# Patient Record
Sex: MALE | Race: OTHER | HISPANIC OR LATINO
[De-identification: names, ages, dates, MRNs, and addresses within clinical notes are randomized per-mention and may not be internally consistent; named-entity substitution may affect disease eponyms.]

---

## 2018-05-02 PROBLEM — Z00.00 ENCOUNTER FOR PREVENTIVE HEALTH EXAMINATION: Status: ACTIVE | Noted: 2018-05-02

## 2018-05-10 ENCOUNTER — APPOINTMENT (OUTPATIENT)
Dept: VASCULAR SURGERY | Facility: CLINIC | Age: 73
End: 2018-05-10

## 2018-05-31 ENCOUNTER — APPOINTMENT (OUTPATIENT)
Dept: VASCULAR SURGERY | Facility: CLINIC | Age: 73
End: 2018-05-31
Payer: MEDICARE

## 2018-05-31 VITALS — HEIGHT: 67 IN | WEIGHT: 130 LBS | BODY MASS INDEX: 20.4 KG/M2

## 2018-05-31 PROCEDURE — 99024 POSTOP FOLLOW-UP VISIT: CPT

## 2018-05-31 RX ORDER — INSULIN LISPRO 100 [IU]/ML
100 INJECTION, SOLUTION INTRAVENOUS; SUBCUTANEOUS
Qty: 15 | Refills: 0 | Status: ACTIVE | COMMUNITY
Start: 2017-12-21

## 2018-05-31 RX ORDER — PEN NEEDLE, DIABETIC 31 GX3/16"
31G X 8 MM NEEDLE, DISPOSABLE MISCELLANEOUS
Qty: 100 | Refills: 0 | Status: ACTIVE | COMMUNITY
Start: 2018-03-01

## 2018-05-31 RX ORDER — HYDRALAZINE HYDROCHLORIDE 50 MG/1
50 TABLET ORAL
Qty: 90 | Refills: 0 | Status: ACTIVE | COMMUNITY
Start: 2018-02-15

## 2018-05-31 RX ORDER — HYDRALAZINE HYDROCHLORIDE 100 MG/1
100 TABLET ORAL
Qty: 90 | Refills: 0 | Status: ACTIVE | COMMUNITY
Start: 2018-02-15

## 2018-05-31 RX ORDER — INSULIN DETEMIR 100 [IU]/ML
100 INJECTION, SOLUTION SUBCUTANEOUS
Qty: 15 | Refills: 0 | Status: ACTIVE | COMMUNITY
Start: 2018-01-11

## 2018-05-31 RX ORDER — MUPIROCIN 20 MG/G
2 OINTMENT TOPICAL
Qty: 22 | Refills: 0 | Status: ACTIVE | COMMUNITY
Start: 2018-03-27

## 2018-05-31 RX ORDER — MIRTAZAPINE 7.5 MG/1
7.5 TABLET, FILM COATED ORAL
Qty: 30 | Refills: 0 | Status: ACTIVE | COMMUNITY
Start: 2018-01-25

## 2018-05-31 RX ORDER — FUROSEMIDE 40 MG/1
40 TABLET ORAL
Qty: 12 | Refills: 0 | Status: ACTIVE | COMMUNITY
Start: 2018-01-25

## 2018-05-31 RX ORDER — LACTULOSE SOLUTION USP, 10 G/15 ML 10 G/15ML
10 SOLUTION ORAL; RECTAL
Qty: 473 | Refills: 0 | Status: ACTIVE | COMMUNITY
Start: 2018-01-11

## 2018-05-31 RX ORDER — CALCITRIOL 0.25 UG/1
0.25 CAPSULE, LIQUID FILLED ORAL
Qty: 30 | Refills: 0 | Status: ACTIVE | COMMUNITY
Start: 2018-02-22

## 2018-05-31 RX ORDER — FUROSEMIDE 80 MG/1
80 TABLET ORAL
Qty: 30 | Refills: 0 | Status: ACTIVE | COMMUNITY
Start: 2018-02-15

## 2018-05-31 RX ORDER — CLONIDINE HYDROCHLORIDE 0.1 MG/1
0.1 TABLET ORAL
Qty: 60 | Refills: 0 | Status: ACTIVE | COMMUNITY
Start: 2018-02-15

## 2018-05-31 RX ORDER — NIFEDIPINE 60 MG/1
60 TABLET, FILM COATED, EXTENDED RELEASE ORAL
Qty: 60 | Refills: 0 | Status: ACTIVE | COMMUNITY
Start: 2018-01-25

## 2018-05-31 RX ORDER — CYPROHEPTADINE HYDROCHLORIDE 4 MG/1
4 TABLET ORAL
Qty: 60 | Refills: 0 | Status: ACTIVE | COMMUNITY
Start: 2018-01-04

## 2018-05-31 RX ORDER — LEVETIRACETAM 250 MG/1
250 TABLET, FILM COATED ORAL
Qty: 60 | Refills: 0 | Status: ACTIVE | COMMUNITY
Start: 2018-02-22

## 2018-05-31 RX ORDER — LEVOFLOXACIN 250 MG/1
250 TABLET, FILM COATED ORAL
Qty: 4 | Refills: 0 | Status: ACTIVE | COMMUNITY
Start: 2018-03-27

## 2018-06-14 ENCOUNTER — APPOINTMENT (OUTPATIENT)
Dept: VASCULAR SURGERY | Facility: CLINIC | Age: 73
End: 2018-06-14
Payer: MEDICARE

## 2018-06-14 PROCEDURE — 99024 POSTOP FOLLOW-UP VISIT: CPT

## 2018-06-28 ENCOUNTER — APPOINTMENT (OUTPATIENT)
Dept: VASCULAR SURGERY | Facility: CLINIC | Age: 73
End: 2018-06-28

## 2018-07-12 ENCOUNTER — APPOINTMENT (OUTPATIENT)
Dept: VASCULAR SURGERY | Facility: CLINIC | Age: 73
End: 2018-07-12
Payer: MEDICARE

## 2018-07-12 DIAGNOSIS — Z89.431 ACQUIRED ABSENCE OF RIGHT FOOT: ICD-10-CM

## 2018-07-12 PROCEDURE — 99024 POSTOP FOLLOW-UP VISIT: CPT

## 2018-07-17 DIAGNOSIS — Z86.69 PERSONAL HISTORY OF OTHER DISEASES OF THE NERVOUS SYSTEM AND SENSE ORGANS: ICD-10-CM

## 2018-07-17 DIAGNOSIS — Z86.19 PERSONAL HISTORY OF OTHER INFECTIOUS AND PARASITIC DISEASES: ICD-10-CM

## 2018-07-17 DIAGNOSIS — T86.19 OTHER COMPLICATION OF KIDNEY TRANSPLANT: ICD-10-CM

## 2018-07-17 DIAGNOSIS — N18.4 CHRONIC KIDNEY DISEASE, STAGE 4 (SEVERE): ICD-10-CM

## 2018-07-17 DIAGNOSIS — I70.1 OTHER COMPLICATION OF KIDNEY TRANSPLANT: ICD-10-CM

## 2018-07-17 DIAGNOSIS — E78.5 HYPERLIPIDEMIA, UNSPECIFIED: ICD-10-CM

## 2018-07-17 DIAGNOSIS — Z01.810 ENCOUNTER FOR PREPROCEDURAL CARDIOVASCULAR EXAMINATION: ICD-10-CM

## 2018-07-17 DIAGNOSIS — R91.1 SOLITARY PULMONARY NODULE: ICD-10-CM

## 2018-07-17 DIAGNOSIS — Z83.3 FAMILY HISTORY OF DIABETES MELLITUS: ICD-10-CM

## 2018-07-17 DIAGNOSIS — Z87.19 PERSONAL HISTORY OF OTHER DISEASES OF THE DIGESTIVE SYSTEM: ICD-10-CM

## 2018-07-17 DIAGNOSIS — I10 ESSENTIAL (PRIMARY) HYPERTENSION: ICD-10-CM

## 2018-07-17 DIAGNOSIS — Z86.39 PERSONAL HISTORY OF OTHER ENDOCRINE, NUTRITIONAL AND METABOLIC DISEASE: ICD-10-CM

## 2018-07-17 RX ORDER — METOPROLOL TARTRATE 50 MG/1
50 TABLET, FILM COATED ORAL TWICE DAILY
Refills: 0 | Status: ACTIVE | COMMUNITY
Start: 2018-03-15

## 2018-07-17 RX ORDER — ASPIRIN 81 MG/1
81 TABLET ORAL DAILY
Refills: 0 | Status: ACTIVE | COMMUNITY
Start: 2018-02-22

## 2018-07-17 RX ORDER — CLOPIDOGREL BISULFATE 75 MG/1
75 TABLET, FILM COATED ORAL DAILY
Refills: 0 | Status: ACTIVE | COMMUNITY
Start: 2018-02-22

## 2018-07-17 RX ORDER — TAMSULOSIN HYDROCHLORIDE 0.4 MG/1
0.4 CAPSULE ORAL AT BEDTIME
Refills: 0 | Status: ACTIVE | COMMUNITY
Start: 2018-02-22

## 2018-07-17 RX ORDER — FAMOTIDINE 20 MG/1
20 TABLET, FILM COATED ORAL DAILY
Refills: 0 | Status: ACTIVE | COMMUNITY
Start: 2018-02-22

## 2018-07-17 RX ORDER — FINASTERIDE 5 MG/1
5 TABLET, FILM COATED ORAL DAILY
Refills: 0 | Status: ACTIVE | COMMUNITY
Start: 2018-02-22

## 2018-07-17 RX ORDER — ATORVASTATIN CALCIUM 10 MG/1
10 TABLET, FILM COATED ORAL AT BEDTIME
Refills: 0 | Status: ACTIVE | COMMUNITY
Start: 2018-02-22

## 2018-07-17 RX ORDER — PREDNISONE 5 MG/1
5 TABLET ORAL DAILY
Refills: 0 | Status: ACTIVE | COMMUNITY
Start: 2018-02-08

## 2018-07-20 ENCOUNTER — APPOINTMENT (OUTPATIENT)
Dept: CARDIOLOGY | Facility: CLINIC | Age: 73
End: 2018-07-20

## 2018-08-16 ENCOUNTER — APPOINTMENT (OUTPATIENT)
Dept: VASCULAR SURGERY | Facility: CLINIC | Age: 73
End: 2018-08-16
Payer: MEDICARE

## 2018-08-16 PROCEDURE — 99024 POSTOP FOLLOW-UP VISIT: CPT

## 2018-08-30 ENCOUNTER — APPOINTMENT (OUTPATIENT)
Dept: VASCULAR SURGERY | Facility: CLINIC | Age: 73
End: 2018-08-30
Payer: MEDICARE

## 2018-08-30 PROCEDURE — 99024 POSTOP FOLLOW-UP VISIT: CPT

## 2018-08-30 RX ORDER — SILVER SULFADIAZINE 10 MG/G
1 CREAM TOPICAL DAILY
Qty: 1 | Refills: 4 | Status: ACTIVE | COMMUNITY
Start: 2018-08-30 | End: 1900-01-01

## 2018-09-04 ENCOUNTER — APPOINTMENT (OUTPATIENT)
Dept: VASCULAR SURGERY | Facility: CLINIC | Age: 73
End: 2018-09-04
Payer: MEDICARE

## 2018-09-04 PROCEDURE — 99024 POSTOP FOLLOW-UP VISIT: CPT

## 2018-09-20 ENCOUNTER — APPOINTMENT (OUTPATIENT)
Dept: VASCULAR SURGERY | Facility: CLINIC | Age: 73
End: 2018-09-20

## 2018-11-15 ENCOUNTER — APPOINTMENT (OUTPATIENT)
Dept: VASCULAR SURGERY | Facility: CLINIC | Age: 73
End: 2018-11-15
Payer: MEDICARE

## 2018-11-15 PROCEDURE — 99024 POSTOP FOLLOW-UP VISIT: CPT

## 2018-12-18 ENCOUNTER — APPOINTMENT (OUTPATIENT)
Dept: VASCULAR SURGERY | Facility: HOSPITAL | Age: 73
End: 2018-12-18
Payer: MEDICARE

## 2018-12-18 PROCEDURE — 15100 SPLT AGRFT T/A/L 1ST 100SQCM: CPT | Mod: RT

## 2018-12-18 PROCEDURE — 15101 SPLT AGRFT T/A/L EA ADDL 100: CPT | Mod: RT

## 2018-12-19 PROCEDURE — 36902 INTRO CATH DIALYSIS CIRCUIT: CPT | Mod: 79

## 2018-12-19 PROCEDURE — 36215 PLACE CATHETER IN ARTERY: CPT | Mod: 79

## 2018-12-19 PROCEDURE — 76937 US GUIDE VASCULAR ACCESS: CPT | Mod: 26

## 2018-12-27 ENCOUNTER — APPOINTMENT (OUTPATIENT)
Dept: VASCULAR SURGERY | Facility: CLINIC | Age: 73
End: 2018-12-27
Payer: MEDICARE

## 2018-12-27 VITALS — SYSTOLIC BLOOD PRESSURE: 101 MMHG | HEART RATE: 76 BPM | DIASTOLIC BLOOD PRESSURE: 66 MMHG

## 2018-12-27 PROCEDURE — 99024 POSTOP FOLLOW-UP VISIT: CPT

## 2019-03-19 ENCOUNTER — RESULT REVIEW (OUTPATIENT)
Age: 74
End: 2019-03-19

## 2019-05-09 ENCOUNTER — APPOINTMENT (OUTPATIENT)
Dept: VASCULAR SURGERY | Facility: CLINIC | Age: 74
End: 2019-05-09
Payer: MEDICARE

## 2019-05-09 PROCEDURE — 99213 OFFICE O/P EST LOW 20 MIN: CPT

## 2019-05-09 RX ORDER — SILVER SULFADIAZINE 10 MG/G
1 CREAM TOPICAL DAILY
Qty: 1 | Refills: 4 | Status: ACTIVE | COMMUNITY
Start: 2019-05-09 | End: 1900-01-01

## 2019-06-03 NOTE — REASON FOR VISIT
[de-identified] : right leg STSG of open BKA stump [de-identified] : s/p right leg STSG of open BKA stump. Doing well. No pain. No fevers or chills. [Spouse] : spouse

## 2019-06-03 NOTE — DISCUSSION/SUMMARY
[FreeTextEntry1] : initially well healed right thigh donor site, now with some superficial breakdown\par clean, no infetion\par They will continue f/u with me at wound care center on Monday.

## 2019-06-03 NOTE — PHYSICAL EXAM
[Alert] : alert [Oriented to Person] : oriented to person [Oriented to Place] : oriented to place [Calm] : calm [de-identified] : NCAT [de-identified] : NAD [de-identified] : supple [FreeTextEntry1] : left arm radiocephalic avf with palpable thrill [de-identified] : both bka stumps well healed

## 2019-06-20 ENCOUNTER — APPOINTMENT (OUTPATIENT)
Dept: VASCULAR SURGERY | Facility: CLINIC | Age: 74
End: 2019-06-20
Payer: MEDICARE

## 2019-06-20 VITALS — DIASTOLIC BLOOD PRESSURE: 93 MMHG | SYSTOLIC BLOOD PRESSURE: 163 MMHG | HEART RATE: 73 BPM

## 2019-06-20 PROCEDURE — 99213 OFFICE O/P EST LOW 20 MIN: CPT

## 2019-06-20 PROCEDURE — 93990 DOPPLER FLOW TESTING: CPT

## 2019-06-26 ENCOUNTER — RESULT REVIEW (OUTPATIENT)
Age: 74
End: 2019-06-26

## 2019-06-26 ENCOUNTER — APPOINTMENT (OUTPATIENT)
Dept: VASCULAR SURGERY | Facility: HOSPITAL | Age: 74
End: 2019-06-26
Payer: MEDICARE

## 2019-06-26 PROCEDURE — 36902 INTRO CATH DIALYSIS CIRCUIT: CPT | Mod: 59

## 2019-06-26 PROCEDURE — 36215 PLACE CATHETER IN ARTERY: CPT

## 2019-06-26 PROCEDURE — 76937 US GUIDE VASCULAR ACCESS: CPT | Mod: 26

## 2019-07-24 NOTE — ASSESSMENT
[FreeTextEntry1] : Pt with inadequate HD due to low flows\par HD access DUS performed, reveals inflow radial artery, anastamsosis and juxta-anastamotic stenosis\par Will schedule fistulogram and intervention

## 2019-07-24 NOTE — PHYSICAL EXAM
[Normal Breath Sounds] : Normal breath sounds [2+] : left 2+ [Alert] : alert [Oriented to Person] : oriented to person [Oriented to Place] : oriented to place [de-identified] : NCAT [de-identified] : NAD [Calm] : calm [FreeTextEntry1] : left arm radiocephalic avf with very weak thrill [de-identified] : supple [de-identified] : both bka stumps well healed

## 2019-07-24 NOTE — HISTORY OF PRESENT ILLNESS
[de-identified] : BKA stumps remain fully healed. He is using prosthesis. Ambulating for some parts of the day.\par Reports that HD is not going well, low flows from AVF.

## 2019-07-24 NOTE — REASON FOR VISIT
[Follow-Up: _____] : a [unfilled] follow-up visit [de-identified] : he is s/p b/l BKA [de-identified] : BKA stumps remain fully healed. He is using prosthesis. Ambulating for some parts of the day.\par Reports that HD is not going well, low flows from AVF. [Spouse] : spouse

## 2019-07-24 NOTE — PROCEDURE
[FreeTextEntry1] : HD access DUS performed - inflow radial artery, anastamsosis and juxta-anastamotic stenosis

## 2019-08-01 ENCOUNTER — APPOINTMENT (OUTPATIENT)
Dept: VASCULAR SURGERY | Facility: CLINIC | Age: 74
End: 2019-08-01
Payer: MEDICARE

## 2019-08-01 PROCEDURE — 99214 OFFICE O/P EST MOD 30 MIN: CPT

## 2019-08-01 NOTE — ASSESSMENT
[FreeTextEntry1] : Underwent left arm fistulogram, fistuloplasty and angioplasty of inflow radial artery with good results. Now with palpable thrill, undergoing HD without problems. He will continue use of AVF.\par He will f/u with me in 2 months.

## 2019-08-01 NOTE — PHYSICAL EXAM
[Normal Breath Sounds] : Normal breath sounds [2+] : left 2+ [Alert] : alert [Oriented to Person] : oriented to person [Oriented to Place] : oriented to place [Calm] : calm [de-identified] : NAD [de-identified] : NCAT [de-identified] : supple [FreeTextEntry1] : left arm radiocephalic avf with palpable thrill [de-identified] : both bka stumps well healed

## 2019-08-01 NOTE — HISTORY OF PRESENT ILLNESS
[de-identified] : BKA stumps remain fully healed. He is using prosthesis. Ambulating for some parts of the day.\par Reports that HD is not going well, low flows from AVF.\par \par 8/1/19 - Underwent fistulogram, plasty of his AVF. Now undergoing HD without problems.

## 2019-09-12 ENCOUNTER — APPOINTMENT (OUTPATIENT)
Dept: VASCULAR SURGERY | Facility: CLINIC | Age: 74
End: 2019-09-12
Payer: MEDICARE

## 2019-09-12 PROCEDURE — 99214 OFFICE O/P EST MOD 30 MIN: CPT

## 2019-09-12 NOTE — HISTORY OF PRESENT ILLNESS
[FreeTextEntry1] : 74 y/o M s/p b/l BKA.\par ESRD on HD, undergoing HD via L radiocephalic AVF [de-identified] : BKA stumps remain fully healed. He is using prosthesis. Ambulating for some parts of the day.\par Reports that HD is not going well, low flows from AVF.\par \par 8/1/19 - Underwent fistulogram, plasty of his AVF. Now undergoing HD without problems.\par \par 9/12/19 - Doing well. Wife is concerned regarding "pressure point" over his right BKA stump. Continuing HD without any problems.

## 2019-09-12 NOTE — ASSESSMENT
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 His fistula is currently functioning well without any problems with HD.\par In addition, he is s/p b/l BKA. Right BKA with wound healing problems post op, ultimately required STSG. It is now well healed, and he has a functioning prostheis on both legs.\par \par Continue HD via AVF\par F/U in 3 months, sooner if any problems arise

## 2019-09-12 NOTE — PHYSICAL EXAM
[Normal Breath Sounds] : Normal breath sounds [2+] : right 2+ [Oriented to Person] : oriented to person [Alert] : alert [Calm] : calm [Oriented to Place] : oriented to place [de-identified] : NCAT [de-identified] : NAD [de-identified] : supple [FreeTextEntry1] : left arm radiocephalic avf with palpable thrill [de-identified] : both bka stumps well healed

## 2019-10-03 ENCOUNTER — APPOINTMENT (OUTPATIENT)
Dept: VASCULAR SURGERY | Facility: CLINIC | Age: 74
End: 2019-10-03
Payer: MEDICARE

## 2019-10-03 VITALS
BODY MASS INDEX: 20.36 KG/M2 | SYSTOLIC BLOOD PRESSURE: 156 MMHG | HEART RATE: 76 BPM | WEIGHT: 130 LBS | DIASTOLIC BLOOD PRESSURE: 80 MMHG | TEMPERATURE: 98 F

## 2019-10-03 DIAGNOSIS — Z89.511 ACQUIRED ABSENCE OF RIGHT LEG BELOW KNEE: ICD-10-CM

## 2019-10-03 PROCEDURE — 93990 DOPPLER FLOW TESTING: CPT

## 2019-10-03 PROCEDURE — 99214 OFFICE O/P EST MOD 30 MIN: CPT

## 2019-10-03 RX ORDER — SILVER SULFADIAZINE 10 MG/G
1 CREAM TOPICAL DAILY
Qty: 1 | Refills: 4 | Status: ACTIVE | COMMUNITY
Start: 2019-10-03 | End: 1900-01-01

## 2019-10-03 NOTE — PHYSICAL EXAM
[Normal Breath Sounds] : Normal breath sounds [2+] : left 2+ [Alert] : alert [Oriented to Person] : oriented to person [Oriented to Place] : oriented to place [Calm] : calm [de-identified] : NAD [de-identified] : NCAT [de-identified] : supple [FreeTextEntry1] : left arm radiocephalic AVF with very soft thrill, soft pulse on outflow compression of avf [de-identified] : right BKA stump with focal superficial ulceration, probes about 3mm, no purulence expressed, no erythema / warmth

## 2019-10-03 NOTE — HISTORY OF PRESENT ILLNESS
[FreeTextEntry1] : 72 y/o M s/p b/l BKA.\par ESRD on HD, undergoing HD via L radiocephalic AVF [de-identified] : BKA stumps remain fully healed. He is using prosthesis. Ambulating for some parts of the day.\par Reports that HD is not going well, low flows from AVF.\par \par 8/1/19 - Underwent fistulogram, plasty of his AVF. Now undergoing HD without problems.\par \par 9/12/19 - Doing well. Wife is concerned regarding "pressure point" over his right BKA stump. Continuing HD without any problems.\par \par 10/3/19 - Developed focal skin breakdown and bleeding from base of right BKA stump. Denies fevers or chills.

## 2019-10-03 NOTE — ASSESSMENT
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 On exam, he has a very weak thrill, and HD access DUS reveals inflow radial artery and juxtaanastomotic AVF with high grade stenosis. We discussed the risks and benefits of proceeding with fistulogram and intervention, he wishes to proceed. Will schedule fistulogram for next week.\par \par In addition, he has a focal ulceration at the base of his right BKA stump. There is no current evidence of infection. He will apply silvadene and a gauze dressing to the site daily, and not use his prosthesis for the time being. He will see me next week for f/u of this wound.is s/p b/l BKA.

## 2019-10-03 NOTE — PROCEDURE
[FreeTextEntry1] : left arm HD access DUS - inflow radial artery and juxtaanastomotic AVF with high grade stenosis

## 2019-10-08 ENCOUNTER — RESULT REVIEW (OUTPATIENT)
Age: 74
End: 2019-10-08

## 2019-10-08 ENCOUNTER — APPOINTMENT (OUTPATIENT)
Dept: VASCULAR SURGERY | Facility: HOSPITAL | Age: 74
End: 2019-10-08
Payer: MEDICARE

## 2019-10-08 PROCEDURE — 37246 TRLUML BALO ANGIOP 1ST ART: CPT | Mod: LT

## 2019-10-08 PROCEDURE — 36902 INTRO CATH DIALYSIS CIRCUIT: CPT | Mod: 59

## 2019-10-08 PROCEDURE — 36215 PLACE CATHETER IN ARTERY: CPT

## 2019-10-08 PROCEDURE — 76937 US GUIDE VASCULAR ACCESS: CPT | Mod: 26

## 2019-10-21 PROBLEM — T87.43 RIGHT BKA INFECTION: Status: ACTIVE | Noted: 2019-10-21

## 2019-10-24 ENCOUNTER — APPOINTMENT (OUTPATIENT)
Dept: VASCULAR SURGERY | Facility: CLINIC | Age: 74
End: 2019-10-24

## 2019-10-24 DIAGNOSIS — T87.43: ICD-10-CM

## 2019-11-05 PROBLEM — Z89.431 STATUS POST TRANSMETATARSAL AMPUTATION OF RIGHT FOOT: Status: ACTIVE | Noted: 2018-05-31

## 2019-12-05 ENCOUNTER — APPOINTMENT (OUTPATIENT)
Dept: VASCULAR SURGERY | Facility: CLINIC | Age: 74
End: 2019-12-05
Payer: MEDICARE

## 2019-12-05 PROCEDURE — 99213 OFFICE O/P EST LOW 20 MIN: CPT

## 2020-01-02 ENCOUNTER — APPOINTMENT (OUTPATIENT)
Dept: VASCULAR SURGERY | Facility: CLINIC | Age: 75
End: 2020-01-02
Payer: MEDICARE

## 2020-01-02 PROCEDURE — 99213 OFFICE O/P EST LOW 20 MIN: CPT

## 2020-01-02 PROCEDURE — 93990 DOPPLER FLOW TESTING: CPT

## 2020-01-02 NOTE — HISTORY OF PRESENT ILLNESS
[FreeTextEntry1] : 74 y/o M s/p b/l BKA.\par ESRD on HD, undergoing HD via L radiocephalic AVF [de-identified] : BKA stumps remain fully healed. He is using prosthesis. Ambulating for some parts of the day.\par Reports that HD is not going well, low flows from AVF.\par \par 8/1/19 - Underwent fistulogram, plasty of his AVF. Now undergoing HD without problems.\par \par 9/12/19 - Doing well. Wife is concerned regarding "pressure point" over his right BKA stump. Continuing HD without any problems.\par \par 10/3/19 - Developed focal skin breakdown and bleeding from base of right BKA stump. Denies fevers or chills.\par \par 1/2/19  - Doing well. Right BKA focal ulcers fully healed. Undergoing HD via AVF.

## 2020-01-02 NOTE — PHYSICAL EXAM
[Normal Breath Sounds] : Normal breath sounds [Oriented to Person] : oriented to person [2+] : left 2+ [Alert] : alert [Calm] : calm [de-identified] : NAD [Oriented to Place] : oriented to place [de-identified] : NCAT [de-identified] : supple [FreeTextEntry1] : left arm radiocephalic AVF with very soft thrill, soft pulse on outflow compression of af [de-identified] : right BKA stump fully healed, no ulcerations

## 2020-01-02 NOTE — PROCEDURE
[FreeTextEntry1] : left arm HD access DUS - juxtaanastomotic AVF with high grade stenosis, low volumes

## 2020-01-02 NOTE — ASSESSMENT
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 On exam, he has a very weak thrill, and HD access DUS reveals inflow radial artery and juxtaanastomotic AVF with high grade stenosis. We discussed the risks and benefits of proceeding with fistulogram and intervention, he wishes to proceed. Will schedule fistulogram for next week.

## 2020-01-16 ENCOUNTER — RESULT REVIEW (OUTPATIENT)
Age: 75
End: 2020-01-16

## 2020-01-16 ENCOUNTER — APPOINTMENT (OUTPATIENT)
Dept: VASCULAR SURGERY | Facility: HOSPITAL | Age: 75
End: 2020-01-16

## 2020-01-16 NOTE — PHYSICAL EXAM
[Normal Breath Sounds] : Normal breath sounds [2+] : left 2+ [Alert] : alert [Oriented to Person] : oriented to person [Calm] : calm [Oriented to Place] : oriented to place [de-identified] : NAD [de-identified] : NCAT [de-identified] : supple [de-identified] : right BKA stump with focal superficial ulceration, probes about 3mm, no purulence expressed, no erythema / warmth [FreeTextEntry1] : left arm radiocephalic AVF with very soft thrill, soft pulse on outflow compression of avf

## 2020-01-16 NOTE — ASSESSMENT
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 On exam, he has a very weak thrill, and HD access DUS reveals inflow radial artery and juxtaanastomotic AVF with high grade stenosis. We discussed the risks and benefits of proceeding with fistulogram and intervention, he wishes to proceed. Will schedule fistulogram for next week.

## 2020-01-16 NOTE — HISTORY OF PRESENT ILLNESS
[FreeTextEntry1] : 72 y/o M s/p b/l BKA.\par ESRD on HD, undergoing HD via L radiocephalic AVF [de-identified] : BKA stumps remain fully healed. He is using prosthesis. Ambulating for some parts of the day.\par Reports that HD is not going well, low flows from AVF.\par \par 8/1/19 - Underwent fistulogram, plasty of his AVF. Now undergoing HD without problems.\par \par 9/12/19 - Doing well. Wife is concerned regarding "pressure point" over his right BKA stump. Continuing HD without any problems.\par \par 10/3/19 - Developed focal skin breakdown and bleeding from base of right BKA stump. Denies fevers or chills.\par \par 12/5/19 - Undergoing HD via left wrist AVF, episodes of low flow.

## 2020-02-13 ENCOUNTER — APPOINTMENT (OUTPATIENT)
Dept: VASCULAR SURGERY | Facility: CLINIC | Age: 75
End: 2020-02-13
Payer: MEDICARE

## 2020-02-13 PROCEDURE — 99213 OFFICE O/P EST LOW 20 MIN: CPT

## 2020-02-13 NOTE — ASSESSMENT
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 He underwent a fistulogram, angioplasty of inflow radial artery and fistuloplasty. He has an excellent thrill in AVF, and is undergoing HD without issues.\par He will f/u with me in 2-3 months, sooner if any issues with HD.

## 2020-02-13 NOTE — PHYSICAL EXAM
[Normal Breath Sounds] : Normal breath sounds [2+] : right 2+ [Oriented to Person] : oriented to person [Alert] : alert [Oriented to Place] : oriented to place [Calm] : calm [de-identified] : NAD [de-identified] : NCAT [de-identified] : supple [FreeTextEntry1] : left arm radiocephalic AVF with excellent palpable thrill [de-identified] : right BKA stump fully healed, no ulcerations

## 2020-04-16 ENCOUNTER — APPOINTMENT (OUTPATIENT)
Dept: VASCULAR SURGERY | Facility: CLINIC | Age: 75
End: 2020-04-16
Payer: MEDICARE

## 2020-04-16 PROCEDURE — 99213 OFFICE O/P EST LOW 20 MIN: CPT

## 2020-04-16 NOTE — HISTORY OF PRESENT ILLNESS
[FreeTextEntry1] : 74 y/o M s/p b/l BKA.\par ESRD on HD, undergoing HD via L radiocephalic AVF [de-identified] : BKA stumps remain fully healed. He is using prosthesis. Ambulating for some parts of the day.\par Reports that HD is not going well, low flows from AVF.\par \par 8/1/19 - Underwent fistulogram, plasty of his AVF. Now undergoing HD without problems.\par \par 9/12/19 - Doing well. Wife is concerned regarding "pressure point" over his right BKA stump. Continuing HD without any problems.\par \par 10/3/19 - Developed focal skin breakdown and bleeding from base of right BKA stump. Denies fevers or chills.\par \par 1/2/19  - Doing well. Right BKA focal ulcers fully healed. Undergoing HD via AVF.\par \par 4/16/20 - s/p fistulogram, angioplasty of inflow radial artery and fistuloplasty in January; doing well, undergoing HD without problems.

## 2020-04-16 NOTE — ASSESSMENT
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 He underwent a fistulogram, angioplasty of inflow radial artery and fistuloplasty 3 months ago. He has an excellent thrill in AVF, and is undergoing HD without issues.\par He will f/u with me in 2-3 months, sooner if any issues with HD.

## 2020-04-16 NOTE — PHYSICAL EXAM
[Normal Breath Sounds] : Normal breath sounds [2+] : left 2+ [Alert] : alert [Oriented to Person] : oriented to person [Oriented to Place] : oriented to place [Calm] : calm [de-identified] : NAD [de-identified] : NCAT [de-identified] : supple [FreeTextEntry1] : left arm radiocephalic AVF with excellent palpable thrill [de-identified] : right BKA stump fully healed, no ulcerations

## 2020-05-14 ENCOUNTER — APPOINTMENT (OUTPATIENT)
Dept: VASCULAR SURGERY | Facility: CLINIC | Age: 75
End: 2020-05-14
Payer: MEDICARE

## 2020-05-14 PROCEDURE — 93990 DOPPLER FLOW TESTING: CPT

## 2020-05-14 PROCEDURE — 99214 OFFICE O/P EST MOD 30 MIN: CPT

## 2020-05-14 NOTE — HISTORY OF PRESENT ILLNESS
[de-identified] : BKA stumps remain fully healed. He is using prosthesis. Ambulating for some parts of the day.\par Reports that HD is not going well, low flows from AVF.\par \par 8/1/19 - Underwent fistulogram, plasty of his AVF. Now undergoing HD without problems.\par \par 9/12/19 - Doing well. Wife is concerned regarding "pressure point" over his right BKA stump. Continuing HD without any problems.\par \par 10/3/19 - Developed focal skin breakdown and bleeding from base of right BKA stump. Denies fevers or chills.\par \par 1/2/19  - Doing well. Right BKA focal ulcers fully healed. Undergoing HD via AVF.\par \par 4/16/20 - s/p fistulogram, angioplasty of inflow radial artery and fistuloplasty in January; doing well, undergoing HD without problems.\par \par 5/14/20 - Received call from Dr. Rosales nephrologist, Mr. Armstrong is confused, loss of appetite, not receiving adequate HD with poor flows. [FreeTextEntry1] : 74 y/o M s/p b/l BKA.\par ESRD on HD, undergoing HD via L radiocephalic AVF

## 2020-05-14 NOTE — ASSESSMENT
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 Received call from Dr. Rosales, inadequate HD, poor flows. Pt is developing confusion and loss of appetite. DUS reveals high grade stenosis of radial artery proximal to AVF, anastomotic and juxta-anastomotic stenosis, poor flows distally. Dr. Rosales and patient / wife strongly prefer outpatient procedure to salvage AVF and then dialyze, they want to avoid hospitalization during this time.\par On schedule for fistulogram / intervention tomorrow.

## 2020-05-14 NOTE — PHYSICAL EXAM
[Normal Breath Sounds] : Normal breath sounds [2+] : left 2+ [Alert] : alert [Oriented to Person] : oriented to person [Oriented to Place] : oriented to place [de-identified] : NAD [Calm] : calm [de-identified] : supple [de-identified] : NCAT [FreeTextEntry1] : left arm radiocephalic AVF with very soft thrill [de-identified] : right BKA stump fully healed, no ulcerations

## 2020-05-14 NOTE — PROCEDURE
[FreeTextEntry1] : left arm HD access DUS performed - high grade stenosis of radial artery proximal to AVF, anastomotic and juxta-anastomotic stenosis, poor flows distally

## 2020-05-15 ENCOUNTER — RESULT REVIEW (OUTPATIENT)
Age: 75
End: 2020-05-15

## 2020-05-28 ENCOUNTER — APPOINTMENT (OUTPATIENT)
Dept: VASCULAR SURGERY | Facility: CLINIC | Age: 75
End: 2020-05-28
Payer: MEDICARE

## 2020-05-28 PROCEDURE — 99214 OFFICE O/P EST MOD 30 MIN: CPT

## 2020-05-28 NOTE — HISTORY OF PRESENT ILLNESS
[FreeTextEntry1] : 72 y/o M s/p b/l BKA.\par ESRD on HD, undergoing HD via L radiocephalic AVF [de-identified] : BKA stumps remain fully healed. He is using prosthesis. Ambulating for some parts of the day.\par Reports that HD is not going well, low flows from AVF.\par \par 8/1/19 - Underwent fistulogram, plasty of his AVF. Now undergoing HD without problems.\par \par 9/12/19 - Doing well. Wife is concerned regarding "pressure point" over his right BKA stump. Continuing HD without any problems.\par \par 10/3/19 - Developed focal skin breakdown and bleeding from base of right BKA stump. Denies fevers or chills.\par \par 1/2/19  - Doing well. Right BKA focal ulcers fully healed. Undergoing HD via AVF.\par \par 4/16/20 - s/p fistulogram, angioplasty of inflow radial artery and fistuloplasty in January; doing well, undergoing HD without problems.\par \par 5/14/20 - Received call from Dr. Rosales nephrologist, Mr. Armstrong is confused, loss of appetite, not receiving adequate HD with poor flows.\par \par 5/28/20 - 2 weeks s/p left arm fistulogram, underwent angioplasty of inflow radial artery and fistuloplasty. Now receiving HD without issues, feels well, mental status and appetite back to normal.

## 2020-05-28 NOTE — ASSESSMENT
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 Received call from Dr. Rosales, inadequate HD, poor flows. Pt is developing confusion and loss of appetite. He underwent a fistulogram, angioplasty of inflow radial artery and fistuloplasty. He has an excellent thrill in AVF, and is undergoing HD without issues. His mental status and appetite have returned to normal, he feels well.\par \par He will f/u with me in 2 months, sooner if any issues with HD.

## 2020-07-09 ENCOUNTER — APPOINTMENT (OUTPATIENT)
Dept: VASCULAR SURGERY | Facility: CLINIC | Age: 75
End: 2020-07-09

## 2020-07-16 ENCOUNTER — APPOINTMENT (OUTPATIENT)
Dept: VASCULAR SURGERY | Facility: CLINIC | Age: 75
End: 2020-07-16
Payer: MEDICARE

## 2020-07-16 PROCEDURE — 99213 OFFICE O/P EST LOW 20 MIN: CPT

## 2020-07-16 NOTE — ASSESSMENT
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 He was told by HD nurse to see vascular to reassess AVF. He has a palpable thrill in AVF, and is undergoing HD without issues. \par I will be in touch with Dr. Rosales regarding quality of HD. He will f/u with me in 2 months, sooner if any problems arise with HD.

## 2020-07-16 NOTE — PHYSICAL EXAM
[Normal Breath Sounds] : Normal breath sounds [Alert] : alert [2+] : left 2+ [Oriented to Person] : oriented to person [Oriented to Place] : oriented to place [Calm] : calm [de-identified] : NAD [de-identified] : NCAT [de-identified] : supple [de-identified] : right BKA stump fully healed, no ulcerations [FreeTextEntry1] : left arm radiocephalic AVF with palpable thrill

## 2020-07-16 NOTE — HISTORY OF PRESENT ILLNESS
[FreeTextEntry1] : 72 y/o M s/p b/l BKA.\par ESRD on HD, undergoing HD via L radiocephalic AVF [de-identified] : BKA stumps remain fully healed. He is using prosthesis. Ambulating for some parts of the day.\par Reports that HD is not going well, low flows from AVF.\par \par 8/1/19 - Underwent fistulogram, plasty of his AVF. Now undergoing HD without problems.\par \par 9/12/19 - Doing well. Wife is concerned regarding "pressure point" over his right BKA stump. Continuing HD without any problems.\par \par 10/3/19 - Developed focal skin breakdown and bleeding from base of right BKA stump. Denies fevers or chills.\par \par 1/2/19  - Doing well. Right BKA focal ulcers fully healed. Undergoing HD via AVF.\par \par 4/16/20 - s/p fistulogram, angioplasty of inflow radial artery and fistuloplasty in January; doing well, undergoing HD without problems.\par \par 5/14/20 - Received call from Dr. Rosales nephrologist, Mr. Armstrong is confused, loss of appetite, not receiving adequate HD with poor flows.\par \par 5/28/20 - 2 weeks s/p left arm fistulogram, underwent angioplasty of inflow radial artery and fistuloplasty. Now receiving HD without issues, feels well, mental status and appetite back to normal.\par \par 7/16/20 - Doing well.  Reports that HD has been uneventful.

## 2020-07-23 ENCOUNTER — APPOINTMENT (OUTPATIENT)
Dept: VASCULAR SURGERY | Facility: CLINIC | Age: 75
End: 2020-07-23
Payer: MEDICARE

## 2020-07-23 PROCEDURE — 93990 DOPPLER FLOW TESTING: CPT

## 2020-07-23 PROCEDURE — 99214 OFFICE O/P EST MOD 30 MIN: CPT

## 2020-07-23 NOTE — PHYSICAL EXAM
[Normal Breath Sounds] : Normal breath sounds [2+] : left 2+ [Alert] : alert [Calm] : calm [Oriented to Person] : oriented to person [Oriented to Place] : oriented to place [de-identified] : NAD [FreeTextEntry1] : left arm radiocephalic AVF with very soft thrill [de-identified] : supple [de-identified] : NCAT [de-identified] : right BKA stump fully healed, no ulcerations

## 2020-07-23 NOTE — ASSESSMENT
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 Received call from Dr. Rosales, inadequate HD, poor flows, Kt / V decreased.  DUS reveals high grade stenosis of radial artery proximal to AVF, anastomotic and juxta-anastomotic stenosis, poor flows distally. Dr. Rosales and patient / wife strongly prefer outpatient procedure to salvage AVF and then dialyze, they want to avoid hospitalization during this time.\par Will schedule fistulogram and intervention for Monday 7/27

## 2020-07-23 NOTE — HISTORY OF PRESENT ILLNESS
[FreeTextEntry1] : 72 y/o M s/p b/l BKA.\par ESRD on HD, undergoing HD via L radiocephalic AVF [de-identified] : BKA stumps remain fully healed. He is using prosthesis. Ambulating for some parts of the day.\par Reports that HD is not going well, low flows from AVF.\par \par 8/1/19 - Underwent fistulogram, plasty of his AVF. Now undergoing HD without problems.\par \par 9/12/19 - Doing well. Wife is concerned regarding "pressure point" over his right BKA stump. Continuing HD without any problems.\par \par 10/3/19 - Developed focal skin breakdown and bleeding from base of right BKA stump. Denies fevers or chills.\par \par 1/2/19  - Doing well. Right BKA focal ulcers fully healed. Undergoing HD via AVF.\par \par 4/16/20 - s/p fistulogram, angioplasty of inflow radial artery and fistuloplasty in January; doing well, undergoing HD without problems.\par \par 7/23/20 - Received call from Dr. Rosales nephrologist, Mr. Armstrong is having inadequate HD, Kt / V decreased to 0.97. Asked to evaluate.

## 2020-07-24 ENCOUNTER — RESULT REVIEW (OUTPATIENT)
Age: 75
End: 2020-07-24

## 2020-07-27 ENCOUNTER — APPOINTMENT (OUTPATIENT)
Dept: VASCULAR SURGERY | Facility: HOSPITAL | Age: 75
End: 2020-07-27
Payer: MEDICARE

## 2020-07-27 ENCOUNTER — RESULT REVIEW (OUTPATIENT)
Age: 75
End: 2020-07-27

## 2020-07-27 PROCEDURE — 37246 TRLUML BALO ANGIOP 1ST ART: CPT

## 2020-07-27 PROCEDURE — 36902 INTRO CATH DIALYSIS CIRCUIT: CPT | Mod: 59

## 2020-07-27 PROCEDURE — 36215 PLACE CATHETER IN ARTERY: CPT | Mod: 59

## 2020-08-06 ENCOUNTER — APPOINTMENT (OUTPATIENT)
Dept: VASCULAR SURGERY | Facility: CLINIC | Age: 75
End: 2020-08-06
Payer: MEDICARE

## 2020-08-06 VITALS — SYSTOLIC BLOOD PRESSURE: 157 MMHG | HEART RATE: 66 BPM | DIASTOLIC BLOOD PRESSURE: 88 MMHG

## 2020-08-06 PROCEDURE — 99214 OFFICE O/P EST MOD 30 MIN: CPT

## 2020-09-17 ENCOUNTER — APPOINTMENT (OUTPATIENT)
Dept: VASCULAR SURGERY | Facility: CLINIC | Age: 75
End: 2020-09-17
Payer: MEDICARE

## 2020-09-17 VITALS — SYSTOLIC BLOOD PRESSURE: 178 MMHG | DIASTOLIC BLOOD PRESSURE: 82 MMHG | HEART RATE: 69 BPM

## 2020-09-17 PROCEDURE — 99213 OFFICE O/P EST LOW 20 MIN: CPT

## 2020-09-17 NOTE — PHYSICAL EXAM
[Normal Breath Sounds] : Normal breath sounds [2+] : left 2+ [Alert] : alert [Oriented to Person] : oriented to person [Oriented to Place] : oriented to place [Calm] : calm [de-identified] : NAD [de-identified] : NCAT [de-identified] : supple [FreeTextEntry1] : left arm radiocephalic AVF with palpable thrill [de-identified] : right BKA stump fully healed, no ulcerations

## 2020-09-17 NOTE — ASSESSMENT
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 He was having inadequate HD, poor flows, Kt / V decreased, and a DUS revealed high grade stenosis of radial artery proximal to AVF, anastomotic and juxta-anastomotic stenosis, poor flows distally.\par He underwent a left arm fistulogram, angioplasty of inflow radial artery and fistuloplasty with good results. Now having HD without issues with improvement in Kt / V.\par Will continue to monitor AVF, he will f/u in 2 months.

## 2020-09-17 NOTE — ASSESSMENT
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 He was having inadequate HD, poor flows, Kt / V decreased, and a DUS revealed high grade stenosis of radial artery proximal to AVF, anastomotic and juxta-anastomotic stenosis, poor flows distally.\par He underwent a left arm fistulogram, angioplasty of inflow radial artery and fistuloplasty with good results. Now having HD without issues with improvement in Kt / V.\par Will continue to monitor AVF.

## 2020-09-17 NOTE — PHYSICAL EXAM
[Normal Breath Sounds] : Normal breath sounds [2+] : left 2+ [Alert] : alert [Oriented to Person] : oriented to person [Oriented to Place] : oriented to place [Calm] : calm [de-identified] : NAD [de-identified] : NCAT [de-identified] : supple [FreeTextEntry1] : left arm radiocephalic AVF with palpable thrill [de-identified] : right BKA stump fully healed, no ulcerations

## 2020-09-17 NOTE — HISTORY OF PRESENT ILLNESS
[FreeTextEntry1] : 72 y/o M s/p b/l BKA.\par ESRD on HD, undergoing HD via L radiocephalic AVF [de-identified] : BKA stumps remain fully healed. He is using prosthesis. Ambulating for some parts of the day.\par Reports that HD is not going well, low flows from AVF.\par \par 8/1/19 - Underwent fistulogram, plasty of his AVF. Now undergoing HD without problems.\par \par 9/12/19 - Doing well. Wife is concerned regarding "pressure point" over his right BKA stump. Continuing HD without any problems.\par \par 10/3/19 - Developed focal skin breakdown and bleeding from base of right BKA stump. Denies fevers or chills.\par \par 1/2/19  - Doing well. Right BKA focal ulcers fully healed. Undergoing HD via AVF.\par \par 4/16/20 - s/p fistulogram, angioplasty of inflow radial artery and fistuloplasty in January; doing well, undergoing HD without problems.\par \par 7/23/20 - Received call from Dr. Rosales nephrologist, Mr. Armstrong is having inadequate HD, Kt / V decreased to 0.97. Asked to evaluate.\par \par 8/6/20 - s/p fistulogram, angioplasty of inflow radial artery and fistuloplasty. Now doing very well, HD with no issues, Kt / V much improved.\par \par 9/17/20 - continues to have hd with no issues, kt/v remains improved / stable.

## 2020-09-17 NOTE — HISTORY OF PRESENT ILLNESS
[FreeTextEntry1] : 74 y/o M s/p b/l BKA.\par ESRD on HD, undergoing HD via L radiocephalic AVF [de-identified] : BKA stumps remain fully healed. He is using prosthesis. Ambulating for some parts of the day.\par Reports that HD is not going well, low flows from AVF.\par \par 8/1/19 - Underwent fistulogram, plasty of his AVF. Now undergoing HD without problems.\par \par 9/12/19 - Doing well. Wife is concerned regarding "pressure point" over his right BKA stump. Continuing HD without any problems.\par \par 10/3/19 - Developed focal skin breakdown and bleeding from base of right BKA stump. Denies fevers or chills.\par \par 1/2/19  - Doing well. Right BKA focal ulcers fully healed. Undergoing HD via AVF.\par \par 4/16/20 - s/p fistulogram, angioplasty of inflow radial artery and fistuloplasty in January; doing well, undergoing HD without problems.\par \par 7/23/20 - Received call from Dr. Rosales nephrologist, Mr. Armstrong is having inadequate HD, Kt / V decreased to 0.97. Asked to evaluate.\par \par 8/6/20 - s/p fistulogram, angioplasty of inflow radial artery and fistuloplasty. Now doing very well, HD with no issues, Kt / V much improved.

## 2020-10-22 ENCOUNTER — APPOINTMENT (OUTPATIENT)
Dept: VASCULAR SURGERY | Facility: CLINIC | Age: 75
End: 2020-10-22
Payer: MEDICARE

## 2020-10-22 VITALS — SYSTOLIC BLOOD PRESSURE: 134 MMHG | HEART RATE: 72 BPM | DIASTOLIC BLOOD PRESSURE: 81 MMHG

## 2020-10-22 PROCEDURE — 93990 DOPPLER FLOW TESTING: CPT

## 2020-10-22 PROCEDURE — 99213 OFFICE O/P EST LOW 20 MIN: CPT

## 2020-10-22 PROCEDURE — 99072 ADDL SUPL MATRL&STAF TM PHE: CPT

## 2020-10-22 NOTE — PROCEDURE
[FreeTextEntry1] : HD US - high grade stenosis of juxtaanastomotic AVF, possibly inflow radial artery

## 2020-10-22 NOTE — ASSESSMENT
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

## 2020-10-22 NOTE — PHYSICAL EXAM
[Normal Breath Sounds] : Normal breath sounds [2+] : left 2+ [Alert] : alert [Oriented to Person] : oriented to person [Oriented to Place] : oriented to place [Calm] : calm [de-identified] : NAD [de-identified] : NCAT [de-identified] : supple [FreeTextEntry1] : left arm radiocephalic AVF with soft thrill [de-identified] : right BKA stump fully healed, no ulcerations

## 2020-10-22 NOTE — HISTORY OF PRESENT ILLNESS
[FreeTextEntry1] : 74 y/o M s/p b/l BKA.\par ESRD on HD, undergoing HD via L radiocephalic AVF [de-identified] : BKA stumps remain fully healed. He is using prosthesis. Ambulating for some parts of the day.\par Reports that HD is not going well, low flows from AVF.\par \par 7/23/20 - Received call from Dr. Rosales nephrologist, Mr. Armstrong is having inadequate HD, Kt / V decreased to 0.97. Asked to evaluate.\par \par 8/6/20 - s/p fistulogram, angioplasty of inflow radial artery and fistuloplasty. Now doing very well, HD with no issues, Kt / V much improved.\par \par 9/17/20 - continues to have hd with no issues, kt/v remains improved / stable.\par \par 10/22/20 - Concerned about AVF as Kt/v with downtrend.

## 2020-11-01 ENCOUNTER — RESULT REVIEW (OUTPATIENT)
Age: 75
End: 2020-11-01

## 2020-11-04 ENCOUNTER — APPOINTMENT (OUTPATIENT)
Dept: VASCULAR SURGERY | Facility: HOSPITAL | Age: 75
End: 2020-11-04

## 2020-11-04 ENCOUNTER — RESULT REVIEW (OUTPATIENT)
Age: 75
End: 2020-11-04

## 2020-11-19 ENCOUNTER — APPOINTMENT (OUTPATIENT)
Dept: VASCULAR SURGERY | Facility: CLINIC | Age: 75
End: 2020-11-19
Payer: MEDICARE

## 2020-11-19 PROCEDURE — 99214 OFFICE O/P EST MOD 30 MIN: CPT

## 2020-11-19 NOTE — ASSESSMENT
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 \par He underwent intervention with good results. Undergoing HD without issues, improvement in Kt/V.\par Continue HD via AVF. Return in problems recur, and Dr. Rosales will be in touch with me if HD quality is decreased.

## 2020-11-19 NOTE — HISTORY OF PRESENT ILLNESS
[FreeTextEntry1] : 74 y/o M s/p b/l BKA.\par ESRD on HD, undergoing HD via L radiocephalic AVF [de-identified] : BKA stumps remain fully healed. He is using prosthesis. Ambulating for some parts of the day.\par Reports that HD is not going well, low flows from AVF.\par \par 7/23/20 - Received call from Dr. Rosales nephrologist, Mr. Armstrong is having inadequate HD, Kt / V decreased to 0.97. Asked to evaluate.\par \par 9/17/20 - continues to have hd with no issues, kt/v remains improved / stable.\par \par 10/22/20 - Concerned about AVF as Kt/v with downtrend.\par \par 11/19/20 - s/p fistulogram, angioplasty of inflow radial artery and fistuloplasty. Now doing very well, HD with no issues, Kt / V much improved.

## 2021-01-19 ENCOUNTER — RESULT REVIEW (OUTPATIENT)
Age: 76
End: 2021-01-19

## 2021-01-21 ENCOUNTER — APPOINTMENT (OUTPATIENT)
Dept: VASCULAR SURGERY | Facility: CLINIC | Age: 76
End: 2021-01-21

## 2021-01-22 ENCOUNTER — RESULT REVIEW (OUTPATIENT)
Age: 76
End: 2021-01-22

## 2021-01-22 ENCOUNTER — APPOINTMENT (OUTPATIENT)
Dept: VASCULAR SURGERY | Facility: HOSPITAL | Age: 76
End: 2021-01-22

## 2021-02-04 ENCOUNTER — APPOINTMENT (OUTPATIENT)
Dept: VASCULAR SURGERY | Facility: CLINIC | Age: 76
End: 2021-02-04
Payer: MEDICARE

## 2021-02-04 VITALS
HEIGHT: 67 IN | SYSTOLIC BLOOD PRESSURE: 132 MMHG | BODY MASS INDEX: 20.4 KG/M2 | DIASTOLIC BLOOD PRESSURE: 77 MMHG | WEIGHT: 130 LBS | TEMPERATURE: 75 F

## 2021-02-04 PROCEDURE — 99213 OFFICE O/P EST LOW 20 MIN: CPT

## 2021-02-04 PROCEDURE — 99072 ADDL SUPL MATRL&STAF TM PHE: CPT

## 2021-02-04 RX ORDER — AMOXICILLIN AND CLAVULANATE POTASSIUM 500; 125 MG/1; MG/1
500-125 TABLET, FILM COATED ORAL
Qty: 30 | Refills: 0 | Status: DISCONTINUED | COMMUNITY
Start: 2018-02-12 | End: 2021-02-04

## 2021-02-04 RX ORDER — CIPROFLOXACIN HYDROCHLORIDE 250 MG/1
250 TABLET, FILM COATED ORAL
Qty: 28 | Refills: 0 | Status: DISCONTINUED | COMMUNITY
Start: 2017-12-20 | End: 2021-02-04

## 2021-02-04 RX ORDER — CLINDAMYCIN HYDROCHLORIDE 300 MG/1
300 CAPSULE ORAL EVERY 6 HOURS
Qty: 84 | Refills: 0 | Status: DISCONTINUED | COMMUNITY
Start: 2019-10-21 | End: 2021-02-04

## 2021-02-04 NOTE — ASSESSMENT
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 \par He underwent intervention with good results. Undergoing HD without issues.\par Continue HD via AVF. Return in problems recur, and Dr. Rosales will be in touch with me if HD quality is decreased.

## 2021-02-04 NOTE — HISTORY OF PRESENT ILLNESS
[FreeTextEntry1] : 72 y/o M s/p b/l BKA.\par ESRD on HD, undergoing HD via L radiocephalic AVF [de-identified] : BKA stumps remain fully healed. He is using prosthesis. Ambulating for some parts of the day.\par Reports that HD is not going well, low flows from AVF.\par \par 7/23/20 - Received call from Dr. Rosales nephrologist, Mr. Armstrong is having inadequate HD, Kt / V decreased to 0.97. Asked to evaluate.\par \par 9/17/20 - continues to have hd with no issues, kt/v remains improved / stable.\par \par 10/22/20 - Concerned about AVF as Kt/v with downtrend.\par \par 2/4/21 - s/p fistulogram, angioplasty of inflow radial artery and fistuloplasty. Doing very well, HD with no issues. Appetite is back to normal.

## 2021-02-04 NOTE — PHYSICAL EXAM
[Normal Breath Sounds] : Normal breath sounds [2+] : left 2+ [Alert] : alert [Oriented to Person] : oriented to person [Oriented to Place] : oriented to place [Calm] : calm [de-identified] : NAD [de-identified] : NCAT [de-identified] : supple [FreeTextEntry1] : left arm radiocephalic AVF with strong palpable thrill [de-identified] : right BKA stump fully healed, no ulcerations

## 2021-03-18 ENCOUNTER — APPOINTMENT (OUTPATIENT)
Dept: VASCULAR SURGERY | Facility: CLINIC | Age: 76
End: 2021-03-18

## 2021-05-27 ENCOUNTER — APPOINTMENT (OUTPATIENT)
Dept: VASCULAR SURGERY | Facility: CLINIC | Age: 76
End: 2021-05-27
Payer: MEDICARE

## 2021-05-27 ENCOUNTER — NON-APPOINTMENT (OUTPATIENT)
Age: 76
End: 2021-05-27

## 2021-05-27 VITALS
DIASTOLIC BLOOD PRESSURE: 88 MMHG | RESPIRATION RATE: 16 BRPM | BODY MASS INDEX: 20.4 KG/M2 | HEIGHT: 67 IN | WEIGHT: 130 LBS | SYSTOLIC BLOOD PRESSURE: 156 MMHG | OXYGEN SATURATION: 98 % | HEART RATE: 71 BPM

## 2021-05-27 PROCEDURE — 99072 ADDL SUPL MATRL&STAF TM PHE: CPT

## 2021-05-27 PROCEDURE — 99213 OFFICE O/P EST LOW 20 MIN: CPT

## 2021-05-27 NOTE — PHYSICAL EXAM
[Normal Breath Sounds] : Normal breath sounds [2+] : left 2+ [Alert] : alert [Oriented to Person] : oriented to person [Oriented to Place] : oriented to place [Calm] : calm [de-identified] : NAD [de-identified] : NCAT [de-identified] : supple [FreeTextEntry1] : left arm radiocephalic AVF with palpable thrill [de-identified] : right BKA stump fully healed, no ulcerations

## 2021-05-27 NOTE — ASSESSMENT
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 \par He underwent intervention with good results. Undergoing HD without issues.\par Continue HD via AVF. He will follow up with me in 8 weeks, sooner if any problems arise, and Dr. Rosales will be in touch with me if HD quality is decreased.

## 2021-05-27 NOTE — HISTORY OF PRESENT ILLNESS
[FreeTextEntry1] : 74 y/o M s/p b/l BKA.\par ESRD on HD, undergoing HD via L radiocephalic AVF [de-identified] : BKA stumps remain fully healed. He is using prosthesis. Ambulating for some parts of the day.\par Reports that HD is not going well, low flows from AVF.\par \par 7/23/20 - Received call from Dr. Rosales nephrologist, Mr. Armstrong is having inadequate HD, Kt / V decreased to 0.97. Asked to evaluate.\par \par 9/17/20 - continues to have hd with no issues, kt/v remains improved / stable.\par \par 10/22/20 - Concerned about AVF as Kt/v with downtrend.\par \par 2/4/21 - s/p fistulogram, angioplasty of inflow radial artery and fistuloplasty. Doing very well, HD with no issues. Appetite is back to normal.\par \par 5/27/21 - He continues to do well.  Reports that he is undergoing dialysis without issues.  Quality of dialysis is good.

## 2021-07-08 ENCOUNTER — APPOINTMENT (OUTPATIENT)
Dept: VASCULAR SURGERY | Facility: CLINIC | Age: 76
End: 2021-07-08
Payer: MEDICARE

## 2021-07-08 VITALS — DIASTOLIC BLOOD PRESSURE: 91 MMHG | SYSTOLIC BLOOD PRESSURE: 168 MMHG | HEART RATE: 71 BPM

## 2021-07-08 PROCEDURE — 99072 ADDL SUPL MATRL&STAF TM PHE: CPT

## 2021-07-08 PROCEDURE — 99213 OFFICE O/P EST LOW 20 MIN: CPT

## 2021-07-08 NOTE — HISTORY OF PRESENT ILLNESS
[FreeTextEntry1] : 74 y/o M s/p b/l BKA.\par ESRD on HD, undergoing HD via L radiocephalic AVF [de-identified] : BKA stumps remain fully healed. He is using prosthesis. Ambulating for some parts of the day.\par Reports that HD is not going well, low flows from AVF.\par \par 7/23/20 - Received call from Dr. Rosales nephrologist, Mr. Armstrong is having inadequate HD, Kt / V decreased to 0.97. Asked to evaluate.\par \par 9/17/20 - continues to have hd with no issues, kt/v remains improved / stable.\par \par 10/22/20 - Concerned about AVF as Kt/v with downtrend.\par \par 7/8/21 - Doing well.  Undergoing dialysis without problems.\par \par 2/4/21 - s/p fistulogram, angioplasty of inflow radial artery and fistuloplasty. Doing very well, HD with no issues. Appetite is back to normal.\par \par 5/27/21 - He continues to do well.  Reports that he is undergoing dialysis without issues.  Quality of dialysis is good.

## 2021-07-08 NOTE — PHYSICAL EXAM
[Normal Breath Sounds] : Normal breath sounds [2+] : left 2+ [Alert] : alert [Oriented to Person] : oriented to person [Oriented to Place] : oriented to place [Calm] : calm [de-identified] : NAD [de-identified] : NCAT [de-identified] : supple [FreeTextEntry1] : left arm radiocephalic AVF with palpable thrill [de-identified] : right BKA stump fully healed, no ulcerations

## 2021-07-08 NOTE — ASSESSMENT
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 \par He underwent intervention with good results. Undergoing HD without issues.\par Continue HD via AVF. He will follow up with me in 8 weeks, sooner if any problems arise, and Dr. Rosales will be in touch with me if HD quality is decreased.

## 2021-07-15 ENCOUNTER — APPOINTMENT (OUTPATIENT)
Dept: VASCULAR SURGERY | Facility: CLINIC | Age: 76
End: 2021-07-15
Payer: MEDICARE

## 2021-07-15 VITALS — HEART RATE: 72 BPM | SYSTOLIC BLOOD PRESSURE: 172 MMHG | DIASTOLIC BLOOD PRESSURE: 90 MMHG

## 2021-07-15 PROCEDURE — 99213 OFFICE O/P EST LOW 20 MIN: CPT

## 2021-07-15 PROCEDURE — 99072 ADDL SUPL MATRL&STAF TM PHE: CPT

## 2021-07-15 NOTE — ASSESSMENT
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 \par I have spoken with his nephrologist Dr. Rosales, and his Kt/v has dropped to 1.1, and is a soft palpable thrill in his AV fistula.  I have discussed with him fistulography and fistula plasty to correct this.  He wishes to proceed.\par We will schedule left arm fistulogram.

## 2021-07-15 NOTE — PHYSICAL EXAM
[Normal Breath Sounds] : Normal breath sounds [2+] : left 2+ [Alert] : alert [Oriented to Person] : oriented to person [Oriented to Place] : oriented to place [Calm] : calm [de-identified] : NAD [de-identified] : NCAT [de-identified] : supple [FreeTextEntry1] : left arm radiocephalic AVF with soft thrill [de-identified] : right BKA stump fully healed, no ulcerations

## 2021-07-15 NOTE — HISTORY OF PRESENT ILLNESS
[FreeTextEntry1] : 74 y/o M s/p b/l BKA.\par ESRD on HD, undergoing HD via L radiocephalic AVF [de-identified] : BKA stumps remain fully healed. He is using prosthesis. Ambulating for some parts of the day.\par Reports that HD is not going well, low flows from AVF.\par \par 7/23/20 - Received call from Dr. Rosales nephrologist, Mr. Armstrong is having inadequate HD, Kt / V decreased to 0.97. Asked to evaluate.\par \par 9/17/20 - continues to have hd with no issues, kt/v remains improved / stable.\par \par 10/22/20 - Concerned about AVF as Kt/v with downtrend.\par \par 7/8/21 - Doing well.  Undergoing dialysis without problems.\par \par 2/4/21 - s/p fistulogram, angioplasty of inflow radial artery and fistuloplasty. Doing very well, HD with no issues. Appetite is back to normal.\par \par 5/27/21 - He continues to do well.  Reports that he is undergoing dialysis without issues.  Quality of dialysis is good.\par \par 7/15/21 - Reports that there are issues with dialysis, I have spoken with Dr. Rosales, and his Kt/v values have dropped.

## 2021-08-14 ENCOUNTER — RESULT REVIEW (OUTPATIENT)
Age: 76
End: 2021-08-14

## 2021-08-17 ENCOUNTER — APPOINTMENT (OUTPATIENT)
Dept: VASCULAR SURGERY | Facility: HOSPITAL | Age: 76
End: 2021-08-17

## 2021-08-17 ENCOUNTER — RESULT REVIEW (OUTPATIENT)
Age: 76
End: 2021-08-17

## 2021-09-09 ENCOUNTER — APPOINTMENT (OUTPATIENT)
Dept: VASCULAR SURGERY | Facility: CLINIC | Age: 76
End: 2021-09-09
Payer: MEDICARE

## 2021-09-09 ENCOUNTER — APPOINTMENT (OUTPATIENT)
Dept: VASCULAR SURGERY | Facility: CLINIC | Age: 76
End: 2021-09-09

## 2021-09-09 VITALS — DIASTOLIC BLOOD PRESSURE: 82 MMHG | SYSTOLIC BLOOD PRESSURE: 171 MMHG | HEART RATE: 68 BPM

## 2021-09-09 PROCEDURE — 99213 OFFICE O/P EST LOW 20 MIN: CPT

## 2021-09-21 NOTE — ASSESSMENT
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 \par He underwent a left arm fistulogram and fistula plasty with good results, there is no injury palpable thrill in his AV fistula, and is undergoing dialysis without issues, kt/v is now improved.\par He will continue HD via his AV fistula, and will follow up with me in approximately 3 months, he will return sooner if quality of dialysis worsens or if there is any problems with his AV fistula.  I have discussed these findings and the plan with Dr. Muse.

## 2021-09-21 NOTE — PHYSICAL EXAM
[Normal Breath Sounds] : Normal breath sounds [2+] : left 2+ [Alert] : alert [Oriented to Person] : oriented to person [Oriented to Place] : oriented to place [Calm] : calm [de-identified] : NAD [de-identified] : NCAT [de-identified] : supple [FreeTextEntry1] : left arm radiocephalic AVF with an easily palpable thrill [de-identified] : right BKA stump fully healed, no ulcerations

## 2021-09-21 NOTE — HISTORY OF PRESENT ILLNESS
[FreeTextEntry1] : 74 y/o M s/p b/l BKA.\par ESRD on HD, undergoing HD via L radiocephalic AVF [de-identified] : BKA stumps remain fully healed. He is using prosthesis. Ambulating for some parts of the day.\par Reports that HD is not going well, low flows from AVF.\par \par 7/23/20 - Received call from Dr. Rosales nephrologist, Mr. Armstrong is having inadequate HD, Kt / V decreased to 0.97. Asked to evaluate.\par \par 9/17/20 - continues to have hd with no issues, kt/v remains improved / stable.\par \par 10/22/20 - Concerned about AVF as Kt/v with downtrend.\par \par 7/8/21 - Doing well.  Undergoing dialysis without problems.\par \par 2/4/21 - s/p fistulogram, angioplasty of inflow radial artery and fistuloplasty. Doing very well, HD with no issues. Appetite is back to normal.\par \par 5/27/21 - He continues to do well.  Reports that he is undergoing dialysis without issues.  Quality of dialysis is good.\par \par 7/15/21 - Reports that there are issues with dialysis, I have spoken with Dr. Rosales, and his Kt/v values have dropped.\par \par 9/9/21 - He underwent left arm fistulogram, fistula plasty with good results.  He is now undergoing dialysis without issues, quality of dialysis is good.

## 2021-10-06 PROBLEM — I10 ESSENTIAL HYPERTENSION: Status: ACTIVE | Noted: 2018-07-17

## 2021-11-11 ENCOUNTER — APPOINTMENT (OUTPATIENT)
Dept: VASCULAR SURGERY | Facility: CLINIC | Age: 76
End: 2021-11-11
Payer: MEDICARE

## 2021-11-11 VITALS — DIASTOLIC BLOOD PRESSURE: 81 MMHG | SYSTOLIC BLOOD PRESSURE: 160 MMHG | HEART RATE: 76 BPM

## 2021-11-11 PROCEDURE — 93990 DOPPLER FLOW TESTING: CPT

## 2021-11-11 PROCEDURE — 99213 OFFICE O/P EST LOW 20 MIN: CPT

## 2021-11-11 NOTE — PROCEDURE
[FreeTextEntry1] : HD access duplex performed, severe high-grade stenosis at the juxta anastomotic segment confirmed

## 2021-11-11 NOTE — PHYSICAL EXAM
[Normal Breath Sounds] : Normal breath sounds [2+] : left 2+ [Alert] : alert [Oriented to Person] : oriented to person [Oriented to Place] : oriented to place [Calm] : calm [de-identified] : NAD [de-identified] : NCAT [de-identified] : supple [FreeTextEntry1] : left arm radiocephalic AVF with a soft thrill [de-identified] : right BKA stump fully healed, no ulcerations

## 2021-11-11 NOTE — HISTORY OF PRESENT ILLNESS
[FreeTextEntry1] : 72 y/o M s/p b/l BKA.\par ESRD on HD, undergoing HD via L radiocephalic AVF [de-identified] : BKA stumps remain fully healed. He is using prosthesis. Ambulating for some parts of the day.\par Reports that HD is not going well, low flows from AVF.\par \par 7/23/20 - Received call from Dr. Rosales nephrologist, Mr. Armstrong is having inadequate HD, Kt / V decreased to 0.97. Asked to evaluate.\par \par 9/17/20 - continues to have hd with no issues, kt/v remains improved / stable.\par \par 10/22/20 - Concerned about AVF as Kt/v with downtrend.\par \par 7/8/21 - Doing well.  Undergoing dialysis without problems.\par \par 2/4/21 - s/p fistulogram, angioplasty of inflow radial artery and fistuloplasty. Doing very well, HD with no issues. Appetite is back to normal.\par \par 5/27/21 - He continues to do well.  Reports that he is undergoing dialysis without issues.  Quality of dialysis is good.\par \par 7/15/21 - Reports that there are issues with dialysis, I have spoken with Dr. Rosales, and his Kt/v values have dropped.\par \par 11/11/21 - His Kt/v value dropped again, now 1.1.  Quality of dialysis is poor.  Corroborated this with Dr. Rosales as well.

## 2021-11-11 NOTE — ASSESSMENT
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 \par His Kt/v value dropped again, now 1.1.  Quality of dialysis is poor.  Corroborated this with Dr. Rosales as well.\par I again discussed with him fistulography for correction of this stenosis.  He wishes to proceed.\par We will schedule left arm fistulogram for next week.

## 2021-11-14 ENCOUNTER — RESULT REVIEW (OUTPATIENT)
Age: 76
End: 2021-11-14

## 2021-11-17 ENCOUNTER — APPOINTMENT (OUTPATIENT)
Dept: VASCULAR SURGERY | Facility: HOSPITAL | Age: 76
End: 2021-11-17

## 2022-01-07 ENCOUNTER — FORM ENCOUNTER (OUTPATIENT)
Age: 77
End: 2022-01-07

## 2022-01-08 ENCOUNTER — TRANSCRIPTION ENCOUNTER (OUTPATIENT)
Age: 77
End: 2022-01-08

## 2022-03-10 ENCOUNTER — APPOINTMENT (OUTPATIENT)
Dept: VASCULAR SURGERY | Facility: CLINIC | Age: 77
End: 2022-03-10
Payer: MEDICARE

## 2022-03-10 VITALS — DIASTOLIC BLOOD PRESSURE: 81 MMHG | HEART RATE: 73 BPM | SYSTOLIC BLOOD PRESSURE: 128 MMHG

## 2022-03-10 PROCEDURE — 99213 OFFICE O/P EST LOW 20 MIN: CPT

## 2022-03-10 NOTE — ASSESSMENT
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 \par He has undergone multiple fistulogram's in the past for recurrent treatment of juxta anastomotic inflow stenosis.\par His dialysis is been without issue thus far, unaware of the KT V value.\par \par He will continue dialysis via AV fistula, he will see me if there are any problems that occur.

## 2022-03-10 NOTE — PHYSICAL EXAM
[Normal Breath Sounds] : Normal breath sounds [2+] : left 2+ [Alert] : alert [Oriented to Person] : oriented to person [Oriented to Place] : oriented to place [Calm] : calm [de-identified] : NAD [de-identified] : NCAT [de-identified] : supple [de-identified] : right BKA stump fully healed, no ulcerations [FreeTextEntry1] : left arm radiocephalic AVF with a palpable thrill

## 2022-03-10 NOTE — HISTORY OF PRESENT ILLNESS
[FreeTextEntry1] : 72 y/o M s/p b/l BKA.\par ESRD on HD, undergoing HD via L radiocephalic AVF [de-identified] : BKA stumps remain fully healed. He is using prosthesis. Ambulating for some parts of the day.\par Reports that HD is not going well, low flows from AVF.\par \par 7/23/20 - Received call from Dr. Rosales nephrologist, Mr. Armstrong is having inadequate HD, Kt / V decreased to 0.97. Asked to evaluate.\par \par 9/17/20 - continues to have hd with no issues, kt/v remains improved / stable.\par \par 10/22/20 - Concerned about AVF as Kt/v with downtrend.\par \par 7/8/21 - Doing well.  Undergoing dialysis without problems.\par \par 2/4/21 - s/p fistulogram, angioplasty of inflow radial artery and fistuloplasty. Doing very well, HD with no issues. Appetite is back to normal.\par \par 5/27/21 - He continues to do well.  Reports that he is undergoing dialysis without issues.  Quality of dialysis is good.\par \par 7/15/21 - Reports that there are issues with dialysis, I have spoken with Dr. Rosales, and his Kt/v values have dropped.\par \par 11/11/21 - His Kt/v value dropped again, now 1.1.  Quality of dialysis is poor.  Corroborated this with Dr. Rosales as well.\par \par 3/10/22 - He reports that he is doing well.  No issues with dialysis as far as patient knows.  Feels well otherwise.

## 2022-04-28 ENCOUNTER — APPOINTMENT (OUTPATIENT)
Dept: VASCULAR SURGERY | Facility: CLINIC | Age: 77
End: 2022-04-28
Payer: MEDICARE

## 2022-04-28 VITALS — SYSTOLIC BLOOD PRESSURE: 135 MMHG | HEART RATE: 83 BPM | DIASTOLIC BLOOD PRESSURE: 81 MMHG

## 2022-04-28 DIAGNOSIS — T82.590A OTHER MECHANICAL COMPLICATION OF SURGICALLY CREATED ARTERIOVENOUS FISTULA, INITIAL ENCOUNTER: ICD-10-CM

## 2022-04-28 PROCEDURE — 99213 OFFICE O/P EST LOW 20 MIN: CPT

## 2022-05-05 PROBLEM — T82.590A DIALYSIS AV FISTULA MALFUNCTION: Status: ACTIVE | Noted: 2019-07-11

## 2022-05-05 NOTE — ASSESSMENT
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 \par \par He again has a decrease in the KT V and poor quality of dialysis.  He has a soft thrill of his AV fistula.  I discussed his case with his nephrologist, Dr. Rosales, who also agreed that he needs to undergo intervention of his fistula.\par Discussed fistulogram and intervention with the patient and his wife.  They wish to proceed.\par We will schedule fistulogram and intervention as per the patient schedule.

## 2022-05-05 NOTE — PHYSICAL EXAM
[Normal Breath Sounds] : Normal breath sounds [2+] : left 2+ [Alert] : alert [Oriented to Person] : oriented to person [Oriented to Place] : oriented to place [Calm] : calm [de-identified] : NAD [de-identified] : NCAT [de-identified] : supple [FreeTextEntry1] : left arm radiocephalic AVF with a soft thrill [de-identified] : right BKA stump fully healed, no ulcerations

## 2022-05-05 NOTE — HISTORY OF PRESENT ILLNESS
[FreeTextEntry1] : 74 y/o M s/p b/l BKA.\par ESRD on HD, undergoing HD via L radiocephalic AVF [de-identified] : BKA stumps remain fully healed. He is using prosthesis. Ambulating for some parts of the day.\par Reports that HD is not going well, low flows from AVF.\par \par 7/23/20 - Received call from Dr. Rosales nephrologist, Mr. Armstrong is having inadequate HD, Kt / V decreased to 0.97. Asked to evaluate.\par \par 9/17/20 - continues to have hd with no issues, kt/v remains improved / stable.\par \par 10/22/20 - Concerned about AVF as Kt/v with downtrend.\par \par 7/8/21 - Doing well.  Undergoing dialysis without problems.\par \par 2/4/21 - s/p fistulogram, angioplasty of inflow radial artery and fistuloplasty. Doing very well, HD with no issues. Appetite is back to normal.\par \par 5/27/21 - He continues to do well.  Reports that he is undergoing dialysis without issues.  Quality of dialysis is good.\par \par 7/15/21 - Reports that there are issues with dialysis, I have spoken with Dr. Rosales, and his Kt/v values have dropped.\par \par 11/11/21 - His Kt/v value dropped again, now 1.1.  Quality of dialysis is poor.  Corroborated this with Dr. Rosales as well.\par \par 3/10/22 - He reports that he is doing well.  No issues with dialysis as far as patient knows.  Feels well otherwise.\par \par 5/5/22 - He reports that his dialysis quality is again poor.  KT V decreased.

## 2022-05-14 ENCOUNTER — RESULT REVIEW (OUTPATIENT)
Age: 77
End: 2022-05-14

## 2022-05-16 ENCOUNTER — TRANSCRIPTION ENCOUNTER (OUTPATIENT)
Age: 77
End: 2022-05-16

## 2022-05-16 ENCOUNTER — RESULT REVIEW (OUTPATIENT)
Age: 77
End: 2022-05-16

## 2022-05-16 ENCOUNTER — APPOINTMENT (OUTPATIENT)
Dept: VASCULAR SURGERY | Facility: HOSPITAL | Age: 77
End: 2022-05-16

## 2022-06-23 ENCOUNTER — APPOINTMENT (OUTPATIENT)
Dept: VASCULAR SURGERY | Facility: CLINIC | Age: 77
End: 2022-06-23
Payer: MEDICARE

## 2022-06-23 VITALS — HEART RATE: 80 BPM | SYSTOLIC BLOOD PRESSURE: 144 MMHG | DIASTOLIC BLOOD PRESSURE: 83 MMHG

## 2022-06-23 PROCEDURE — 99213 OFFICE O/P EST LOW 20 MIN: CPT

## 2022-06-24 NOTE — ASSESSMENT
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 \par \par He underwent fistulogram, radial artery angioplasty, fistuloplasty with good success.  He is undergoing dialysis without issues, quality of dialysis improved.  He feels well.\par We will continue surveillance AV fistula, he will return for follow-up in 2 to 3 months, sooner if any issues arise.

## 2022-06-24 NOTE — HISTORY OF PRESENT ILLNESS
[FreeTextEntry1] : 74 y/o M s/p b/l BKA.\par ESRD on HD, undergoing HD via L radiocephalic AVF [de-identified] : BKA stumps remain fully healed. He is using prosthesis. Ambulating for some parts of the day.\par Reports that HD is not going well, low flows from AVF.\par \par 7/23/20 - Received call from Dr. Rosales nephrologist, Mr. Armstrong is having inadequate HD, Kt / V decreased to 0.97. Asked to evaluate.\par \par 9/17/20 - continues to have hd with no issues, kt/v remains improved / stable.\par \par 10/22/20 - Concerned about AVF as Kt/v with downtrend.\par \par 7/8/21 - Doing well.  Undergoing dialysis without problems.\par \par 2/4/21 - s/p fistulogram, angioplasty of inflow radial artery and fistuloplasty. Doing very well, HD with no issues. Appetite is back to normal.\par \par 5/27/21 - He continues to do well.  Reports that he is undergoing dialysis without issues.  Quality of dialysis is good.\par \par 7/15/21 - Reports that there are issues with dialysis, I have spoken with Dr. Rosales, and his Kt/v values have dropped.\par \par 11/11/21 - His Kt/v value dropped again, now 1.1.  Quality of dialysis is poor.  Corroborated this with Dr. Rosales as well.\par \par 3/10/22 - He reports that he is doing well.  No issues with dialysis as far as patient knows.  Feels well otherwise.\par \par 5/5/22 - He reports that his dialysis quality is again poor.  KT V decreased.\par \par 6/23/22 - He is doing well status post AV fistula intervention.  Quality of dialysis is improved.  Feels well.  No complaints.

## 2022-06-24 NOTE — PHYSICAL EXAM
[Normal Breath Sounds] : Normal breath sounds [2+] : left 2+ [Alert] : alert [Oriented to Person] : oriented to person [Oriented to Place] : oriented to place [Calm] : calm [de-identified] : NAD [de-identified] : NCAT [de-identified] : supple [FreeTextEntry1] : left arm radiocephalic AVF with strong palpable thrill [de-identified] : right BKA stump fully healed, no ulcerations

## 2022-07-08 ENCOUNTER — RESULT REVIEW (OUTPATIENT)
Age: 77
End: 2022-07-08

## 2022-07-12 ENCOUNTER — TRANSCRIPTION ENCOUNTER (OUTPATIENT)
Age: 77
End: 2022-07-12

## 2022-09-01 ENCOUNTER — APPOINTMENT (OUTPATIENT)
Dept: VASCULAR SURGERY | Facility: CLINIC | Age: 77
End: 2022-09-01

## 2022-12-17 ENCOUNTER — RESULT REVIEW (OUTPATIENT)
Age: 77
End: 2022-12-17

## 2022-12-19 ENCOUNTER — TRANSCRIPTION ENCOUNTER (OUTPATIENT)
Age: 77
End: 2022-12-19

## 2022-12-19 ENCOUNTER — APPOINTMENT (OUTPATIENT)
Dept: VASCULAR SURGERY | Facility: HOSPITAL | Age: 77
End: 2022-12-19

## 2023-02-07 ENCOUNTER — OFFICE (OUTPATIENT)
Dept: URBAN - METROPOLITAN AREA CLINIC 29 | Facility: CLINIC | Age: 78
Setting detail: OPHTHALMOLOGY
End: 2023-02-07
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
## 2023-02-07 PROCEDURE — NO SHOW FE NO SHOW FEE: Performed by: OPHTHALMOLOGY

## 2023-02-25 ENCOUNTER — RESULT REVIEW (OUTPATIENT)
Age: 78
End: 2023-02-25

## 2023-03-08 ENCOUNTER — TRANSCRIPTION ENCOUNTER (OUTPATIENT)
Age: 78
End: 2023-03-08

## 2023-03-18 ENCOUNTER — TRANSCRIPTION ENCOUNTER (OUTPATIENT)
Age: 78
End: 2023-03-18

## 2023-05-02 ENCOUNTER — RESULT REVIEW (OUTPATIENT)
Age: 78
End: 2023-05-02

## 2023-05-10 ENCOUNTER — TRANSCRIPTION ENCOUNTER (OUTPATIENT)
Age: 78
End: 2023-05-10

## 2023-05-20 ENCOUNTER — TRANSCRIPTION ENCOUNTER (OUTPATIENT)
Age: 78
End: 2023-05-20

## 2023-05-31 ENCOUNTER — TRANSCRIPTION ENCOUNTER (OUTPATIENT)
Age: 78
End: 2023-05-31

## 2023-06-03 ENCOUNTER — TRANSCRIPTION ENCOUNTER (OUTPATIENT)
Age: 78
End: 2023-06-03

## 2025-03-13 NOTE — REASON FOR VISIT
chest wall non-tender, breathing is unlabored without accessory muscle use, normal breath sounds [Follow-Up: _____] : a [unfilled] follow-up visit